# Patient Record
Sex: FEMALE | Race: WHITE | NOT HISPANIC OR LATINO | Employment: OTHER | URBAN - METROPOLITAN AREA
[De-identification: names, ages, dates, MRNs, and addresses within clinical notes are randomized per-mention and may not be internally consistent; named-entity substitution may affect disease eponyms.]

---

## 2021-01-01 ENCOUNTER — HISTORICAL (OUTPATIENT)
Dept: ADMINISTRATIVE | Facility: HOSPITAL | Age: 76
End: 2021-01-01

## 2021-01-01 LAB
ABS NEUT (OLG): 5.91 X10(3)/MCL (ref 2.1–9.2)
BASOPHILS # BLD AUTO: 0.1 X10(3)/MCL (ref 0–0.2)
BASOPHILS NFR BLD AUTO: 1 %
EOSINOPHIL # BLD AUTO: 0.2 X10(3)/MCL (ref 0–0.9)
EOSINOPHIL NFR BLD AUTO: 2 %
ERYTHROCYTE [DISTWIDTH] IN BLOOD BY AUTOMATED COUNT: 16.1 % (ref 11.5–17)
HCT VFR BLD AUTO: 35.9 % (ref 37–47)
HGB BLD-MCNC: 11.6 GM/DL (ref 12–16)
LYMPHOCYTES # BLD AUTO: 1.7 X10(3)/MCL (ref 0.6–4.6)
LYMPHOCYTES NFR BLD AUTO: 20 %
MCH RBC QN AUTO: 28.5 PG (ref 27–31)
MCHC RBC AUTO-ENTMCNC: 32.3 GM/DL (ref 33–36)
MCV RBC AUTO: 88.2 FL (ref 80–94)
MONOCYTES # BLD AUTO: 0.6 X10(3)/MCL (ref 0.1–1.3)
MONOCYTES NFR BLD AUTO: 7 %
NEUTROPHILS # BLD AUTO: 5.91 X10(3)/MCL (ref 2.1–9.2)
NEUTROPHILS NFR BLD AUTO: 69 %
PLATELET # BLD AUTO: 304 X10(3)/MCL (ref 130–400)
PMV BLD AUTO: 11.1 FL (ref 9.4–12.4)
RBC # BLD AUTO: 4.07 X10(6)/MCL (ref 4.2–5.4)
WBC # SPEC AUTO: 8.5 X10(3)/MCL (ref 4.5–11.5)

## 2022-01-01 ENCOUNTER — HOSPITAL ENCOUNTER (INPATIENT)
Facility: HOSPITAL | Age: 77
LOS: 1 days | DRG: 270 | End: 2022-06-26
Attending: STUDENT IN AN ORGANIZED HEALTH CARE EDUCATION/TRAINING PROGRAM | Admitting: INTERNAL MEDICINE
Payer: MEDICARE

## 2022-01-01 ENCOUNTER — HISTORICAL (OUTPATIENT)
Dept: ADMINISTRATIVE | Facility: HOSPITAL | Age: 77
End: 2022-01-01

## 2022-01-01 VITALS — BODY MASS INDEX: 35.43 KG/M2 | WEIGHT: 220.44 LBS | TEMPERATURE: 93 F | HEART RATE: 83 BPM | HEIGHT: 66 IN

## 2022-01-01 VITALS
WEIGHT: 166 LBS | SYSTOLIC BLOOD PRESSURE: 118 MMHG | DIASTOLIC BLOOD PRESSURE: 70 MMHG | HEIGHT: 64 IN | BODY MASS INDEX: 28.34 KG/M2 | OXYGEN SATURATION: 97 %

## 2022-01-01 DIAGNOSIS — I21.3 STEMI (ST ELEVATION MYOCARDIAL INFARCTION): ICD-10-CM

## 2022-01-01 DIAGNOSIS — E87.5 HYPERKALEMIA: ICD-10-CM

## 2022-01-01 DIAGNOSIS — I21.3 ST ELEVATION MYOCARDIAL INFARCTION (STEMI), UNSPECIFIED ARTERY: Primary | ICD-10-CM

## 2022-01-01 DIAGNOSIS — I21.11 STEMI INVOLVING RIGHT CORONARY ARTERY: ICD-10-CM

## 2022-01-01 DIAGNOSIS — I25.10 CAD (CORONARY ARTERY DISEASE): ICD-10-CM

## 2022-01-01 DIAGNOSIS — I44.2 COMPLETE HEART BLOCK: ICD-10-CM

## 2022-01-01 DIAGNOSIS — N17.9 ACUTE RENAL FAILURE, UNSPECIFIED ACUTE RENAL FAILURE TYPE: ICD-10-CM

## 2022-01-01 LAB
ABORH RETYPE: NORMAL
ABS NEUT (OLG): 11.4 X10(3)/MCL (ref 2.1–9.2)
ALBUMIN SERPL-MCNC: 1.8 GM/DL (ref 3.4–4.8)
ALBUMIN/GLOB SERPL: 0.7 RATIO (ref 1.1–2)
ALP SERPL-CCNC: 215 UNIT/L (ref 40–150)
ALT SERPL-CCNC: 568 UNIT/L (ref 0–55)
ANION GAP SERPL CALC-SCNC: 18 MEQ/L
ANISOCYTOSIS BLD QL SMEAR: ABNORMAL
AST SERPL-CCNC: 863 UNIT/L (ref 5–34)
BASE EXCESS ARTERIAL: NORMAL
BASE EXCESS ARTERIAL: NORMAL
BILIRUBIN DIRECT+TOT PNL SERPL-MCNC: 0.8 MG/DL
BUN SERPL-MCNC: 39.2 MG/DL (ref 9.8–20.1)
BUN SERPL-MCNC: 39.4 MG/DL (ref 9.8–20.1)
BURR CELLS (OLG): ABNORMAL
CALCIUM SERPL-MCNC: 7.5 MG/DL (ref 8.4–10.2)
CALCIUM SERPL-MCNC: 7.9 MG/DL (ref 8.4–10.2)
CHLORIDE SERPL-SCNC: 108 MMOL/L (ref 98–107)
CHLORIDE SERPL-SCNC: 98 MMOL/L (ref 98–107)
CO2 SERPL-SCNC: 10 MMOL/L (ref 23–31)
CO2 SERPL-SCNC: 15 MMOL/L (ref 23–31)
CORRECTED TEMPERATURE (PCO2): 47 MMHG (ref 35–45)
CORRECTED TEMPERATURE (PCO2): 54 MMHG (ref 19–50)
CORRECTED TEMPERATURE (PH): 7.03 (ref 7.29–7.61)
CORRECTED TEMPERATURE (PH): 7.1 (ref 7.29–7.61)
CORRECTED TEMPERATURE (PO2): 76 MMHG (ref 80–100)
CORRECTED TEMPERATURE (PO2): 84 MMHG (ref 80–100)
CREAT SERPL-MCNC: 2.29 MG/DL (ref 0.55–1.02)
CREAT SERPL-MCNC: 2.37 MG/DL (ref 0.55–1.02)
CREAT/UREA NIT SERPL: 17
EJECTION FRACTION: 32 %
ERYTHROCYTE [DISTWIDTH] IN BLOOD BY AUTOMATED COUNT: 17.7 % (ref 11.5–17)
FIBRINOGEN PPP-MCNC: 203 MG/DL (ref 210–463)
GLOBULIN SER-MCNC: 2.6 GM/DL (ref 2.4–3.5)
GLUCOSE SERPL-MCNC: 223 MG/DL (ref 82–115)
GLUCOSE SERPL-MCNC: 514 MG/DL (ref 82–115)
GROUP & RH: NORMAL
HCO3 ARTERIAL: NORMAL
HCO3 ARTERIAL: NORMAL
HCO3 UR-SCNC: 14.3 MMOL/L
HCO3 UR-SCNC: 14.6 MMOL/L
HCT VFR BLD AUTO: 33.2 % (ref 37–47)
HGB BLD-MCNC: 10.4 G/DL (ref 12–16)
HGB BLD-MCNC: 7.8 G/DL (ref 12–16)
HGB BLD-MCNC: 9.3 GM/DL (ref 12–16)
HGB BLD-MCNC: NORMAL G/DL
HGB BLD-MCNC: NORMAL G/DL
IMM GRANULOCYTES # BLD AUTO: 1.15 X10(3)/MCL (ref 0–0.02)
IMM GRANULOCYTES NFR BLD AUTO: 7.5 % (ref 0–0.43)
INDIRECT COOMBS GEL: NORMAL
INR BLD: 2.2 (ref 0–1.3)
INSTRUMENT WBC (OLG): 15.4 X10(3)/MCL
LYMPHOCYTES NFR BLD MANUAL: 27 %
LYMPHOCYTES NFR BLD MANUAL: 4.16 X10(3)/MCL
MACROCYTES BLD QL SMEAR: ABNORMAL
MAGNESIUM SERPL-MCNC: 2.2 MG/DL (ref 1.6–2.6)
MCH RBC QN AUTO: 30.2 PG (ref 27–31)
MCHC RBC AUTO-ENTMCNC: 28 MG/DL (ref 33–36)
MCV RBC AUTO: 107.8 FL (ref 80–94)
METAMYELOCYTES NFR BLD MANUAL: 3 %
MYELOCYTES NFR BLD MANUAL: 4 %
NEUTROPHILS NFR BLD MANUAL: 67 %
NRBC BLD AUTO-RTO: 1.7 %
NRBC BLD MANUAL-RTO: 1 %
PCO2 BLDA: 44 MMHG
PCO2 BLDA: 47 MMHG (ref 35–45)
PCO2 BLDA: 54 MMHG (ref 19–50)
PCO2 BLDA: NORMAL MM[HG]
PH SMN: 7.03 [PH] (ref 7.29–7.61)
PH SMN: 7.03 [PH] (ref 7.29–7.61)
PH SMN: 7.1 [PH] (ref 7.29–7.61)
PH SMN: NORMAL [PH]
PH SMN: NORMAL [PH]
PLATELET # BLD AUTO: 165 X10(3)/MCL (ref 130–400)
PLATELET # BLD EST: ADEQUATE 10*3/UL
PMV BLD AUTO: 10.5 FL (ref 9.4–12.4)
PO2 BLDA: 62 MMHG
PO2 BLDA: 76 MMHG (ref 80–100)
PO2 BLDA: 84 MMHG (ref 80–100)
PO2 BLDA: NORMAL MM[HG]
PO2 BLDA: NORMAL MM[HG]
POC BASE DEFICIT: -14.5 MMOL/L (ref -2–2)
POC BASE DEFICIT: -15.6 MMOL/L (ref -2–2)
POC BASE DEFICIT: -18.7 MMOL/L
POC COHB: 1.6 %
POC COHB: 1.8 %
POC COHB: NORMAL
POC COHB: NORMAL
POC HCO3: 11.6 MMOL/L
POC IONIZED CALCIUM: 1.01 MMOL/L (ref 1.12–1.23)
POC IONIZED CALCIUM: 1.07 MMOL/L (ref 1.12–1.23)
POC IONIZED CALCIUM: 1.23 MMOL/L (ref 1.12–1.23)
POC IONIZED CALCIUM: NORMAL
POC IONIZED CALCIUM: NORMAL
POC METHB: 0.4 % (ref 0.4–2)
POC METHB: 0.7 % (ref 0.4–2)
POC METHB: NORMAL
POC METHB: NORMAL
POC O2HB: 90.2 % (ref 94–97)
POC O2HB: 92.9 % (ref 94–97)
POC O2HB: NORMAL
POC O2HB: NORMAL
POC SATURATED O2: 77 %
POC SATURATED O2: 88.6 %
POC SATURATED O2: 89.6 %
POC TEMPERATURE: 37 C
POC TEMPERATURE: 37 °C
POC TEMPERATURE: 37 °C
POIKILOCYTOSIS BLD QL SMEAR: ABNORMAL
POTASSIUM BLD-SCNC: 5.7 MMOL/L (ref 3.5–5)
POTASSIUM BLD-SCNC: 6.7 MMOL/L
POTASSIUM BLD-SCNC: 7.2 MMOL/L (ref 2.4–6.5)
POTASSIUM BLD-SCNC: NORMAL MMOL/L
POTASSIUM BLD-SCNC: NORMAL MMOL/L
POTASSIUM SERPL-SCNC: 5.8 MMOL/L (ref 3.5–5.1)
POTASSIUM SERPL-SCNC: 7 MMOL/L (ref 3.5–5.1)
PROT SERPL-MCNC: 4.4 GM/DL (ref 5.8–7.6)
PROTHROMBIN TIME: 24 SECONDS (ref 12.5–14.5)
RBC # BLD AUTO: 3.08 X10(6)/MCL (ref 4.2–5.4)
SATURATED O2 ARTERIAL, I-STAT: NORMAL
SATURATED O2 ARTERIAL, I-STAT: NORMAL
SODIUM BLD-SCNC: 125 MMOL/L (ref 137–145)
SODIUM BLD-SCNC: 126 MMOL/L (ref 137–145)
SODIUM BLD-SCNC: 131 MMOL/L (ref 137–145)
SODIUM BLD-SCNC: NORMAL MMOL/L
SODIUM BLD-SCNC: NORMAL MMOL/L
SODIUM SERPL-SCNC: 131 MMOL/L (ref 136–145)
SODIUM SERPL-SCNC: 137 MMOL/L (ref 136–145)
SPECIMEN SOURCE: ABNORMAL
WBC # SPEC AUTO: 15.4 X10(3)/MCL (ref 4.5–11.5)

## 2022-01-01 PROCEDURE — 27201423 OPTIME MED/SURG SUP & DEVICES STERILE SUPPLY: Performed by: INTERNAL MEDICINE

## 2022-01-01 PROCEDURE — C1769 GUIDE WIRE: HCPCS | Performed by: INTERNAL MEDICINE

## 2022-01-01 PROCEDURE — 63600175 PHARM REV CODE 636 W HCPCS

## 2022-01-01 PROCEDURE — 86920 COMPATIBILITY TEST SPIN: CPT | Performed by: INTERNAL MEDICINE

## 2022-01-01 PROCEDURE — 36415 COLL VENOUS BLD VENIPUNCTURE: CPT | Performed by: INTERNAL MEDICINE

## 2022-01-01 PROCEDURE — 25000003 PHARM REV CODE 250

## 2022-01-01 PROCEDURE — 86850 RBC ANTIBODY SCREEN: CPT | Performed by: INTERNAL MEDICINE

## 2022-01-01 PROCEDURE — 85384 FIBRINOGEN ACTIVITY: CPT | Performed by: INTERNAL MEDICINE

## 2022-01-01 PROCEDURE — 27000221 HC OXYGEN, UP TO 24 HOURS

## 2022-01-01 PROCEDURE — 25000003 PHARM REV CODE 250: Performed by: INTERNAL MEDICINE

## 2022-01-01 PROCEDURE — 94002 VENT MGMT INPAT INIT DAY: CPT

## 2022-01-01 PROCEDURE — C1887 CATHETER, GUIDING: HCPCS | Performed by: INTERNAL MEDICINE

## 2022-01-01 PROCEDURE — 99900035 HC TECH TIME PER 15 MIN (STAT)

## 2022-01-01 PROCEDURE — 20000000 HC ICU ROOM

## 2022-01-01 PROCEDURE — 27200966 HC CLOSED SUCTION SYSTEM

## 2022-01-01 PROCEDURE — 82803 BLOOD GASES ANY COMBINATION: CPT

## 2022-01-01 PROCEDURE — 33967 INSERT I-AORT PERCUT DEVICE: CPT | Performed by: INTERNAL MEDICINE

## 2022-01-01 PROCEDURE — 93458 L HRT ARTERY/VENTRICLE ANGIO: CPT | Mod: 59 | Performed by: INTERNAL MEDICINE

## 2022-01-01 PROCEDURE — 25500020 PHARM REV CODE 255: Performed by: INTERNAL MEDICINE

## 2022-01-01 PROCEDURE — 83735 ASSAY OF MAGNESIUM: CPT | Performed by: NURSE PRACTITIONER

## 2022-01-01 PROCEDURE — 92941 PRQ TRLML REVSC TOT OCCL AMI: CPT | Mod: RC | Performed by: INTERNAL MEDICINE

## 2022-01-01 PROCEDURE — 99285 EMERGENCY DEPT VISIT HI MDM: CPT | Mod: 25

## 2022-01-01 PROCEDURE — 96365 THER/PROPH/DIAG IV INF INIT: CPT

## 2022-01-01 PROCEDURE — C1757 CATH, THROMBECTOMY/EMBOLECT: HCPCS | Performed by: INTERNAL MEDICINE

## 2022-01-01 PROCEDURE — 36415 COLL VENOUS BLD VENIPUNCTURE: CPT | Performed by: NURSE PRACTITIONER

## 2022-01-01 PROCEDURE — C1725 CATH, TRANSLUMIN NON-LASER: HCPCS | Performed by: INTERNAL MEDICINE

## 2022-01-01 PROCEDURE — 80053 COMPREHEN METABOLIC PANEL: CPT | Performed by: NURSE PRACTITIONER

## 2022-01-01 PROCEDURE — 92921 HC PTCA , ADD'L BRANCH: CPT | Mod: RC | Performed by: INTERNAL MEDICINE

## 2022-01-01 PROCEDURE — 25000242 PHARM REV CODE 250 ALT 637 W/ HCPCS: Performed by: INTERNAL MEDICINE

## 2022-01-01 PROCEDURE — C1753 CATH, INTRAVAS ULTRASOUND: HCPCS | Performed by: INTERNAL MEDICINE

## 2022-01-01 PROCEDURE — 36600 WITHDRAWAL OF ARTERIAL BLOOD: CPT

## 2022-01-01 PROCEDURE — 85025 COMPLETE CBC W/AUTO DIFF WBC: CPT | Performed by: NURSE PRACTITIONER

## 2022-01-01 PROCEDURE — 37799 UNLISTED PX VASCULAR SURGERY: CPT

## 2022-01-01 PROCEDURE — 92978 ENDOLUMINL IVUS OCT C 1ST: CPT | Mod: RC | Performed by: INTERNAL MEDICINE

## 2022-01-01 PROCEDURE — 25000003 PHARM REV CODE 250: Performed by: STUDENT IN AN ORGANIZED HEALTH CARE EDUCATION/TRAINING PROGRAM

## 2022-01-01 PROCEDURE — 96366 THER/PROPH/DIAG IV INF ADDON: CPT

## 2022-01-01 PROCEDURE — 85610 PROTHROMBIN TIME: CPT | Performed by: INTERNAL MEDICINE

## 2022-01-01 PROCEDURE — 96375 TX/PRO/DX INJ NEW DRUG ADDON: CPT

## 2022-01-01 PROCEDURE — C1894 INTRO/SHEATH, NON-LASER: HCPCS | Performed by: INTERNAL MEDICINE

## 2022-01-01 PROCEDURE — 63600175 PHARM REV CODE 636 W HCPCS: Performed by: INTERNAL MEDICINE

## 2022-01-01 PROCEDURE — C1883 ADAPT/EXT, PACING/NEURO LEAD: HCPCS | Performed by: INTERNAL MEDICINE

## 2022-01-01 RX ORDER — FAMOTIDINE 10 MG/ML
20 INJECTION INTRAVENOUS EVERY 12 HOURS
Status: DISCONTINUED | OUTPATIENT
Start: 2022-01-01 | End: 2022-01-01 | Stop reason: HOSPADM

## 2022-01-01 RX ORDER — NITROGLYCERIN 5 MG/ML
INJECTION, SOLUTION INTRAVENOUS
Status: DISCONTINUED | OUTPATIENT
Start: 2022-01-01 | End: 2022-01-01 | Stop reason: HOSPADM

## 2022-01-01 RX ORDER — HYDROCODONE BITARTRATE AND ACETAMINOPHEN 500; 5 MG/1; MG/1
TABLET ORAL
Status: DISCONTINUED | OUTPATIENT
Start: 2022-01-01 | End: 2022-01-01 | Stop reason: HOSPADM

## 2022-01-01 RX ORDER — HYDROMORPHONE HYDROCHLORIDE 2 MG/ML
0.2 INJECTION, SOLUTION INTRAMUSCULAR; INTRAVENOUS; SUBCUTANEOUS EVERY 10 MIN PRN
Status: DISCONTINUED | OUTPATIENT
Start: 2022-01-01 | End: 2022-01-01 | Stop reason: HOSPADM

## 2022-01-01 RX ORDER — FAMOTIDINE 10 MG/ML
20 INJECTION INTRAVENOUS
Status: DISCONTINUED | OUTPATIENT
Start: 2022-01-01 | End: 2022-01-01 | Stop reason: HOSPADM

## 2022-01-01 RX ORDER — SODIUM BICARBONATE 1 MEQ/ML
SYRINGE (ML) INTRAVENOUS
Status: DISCONTINUED | OUTPATIENT
Start: 2022-01-01 | End: 2022-01-01 | Stop reason: HOSPADM

## 2022-01-01 RX ORDER — SODIUM BICARBONATE 1 MEQ/ML
100 SYRINGE (ML) INTRAVENOUS ONCE
Status: COMPLETED | OUTPATIENT
Start: 2022-01-01 | End: 2022-01-01

## 2022-01-01 RX ORDER — MORPHINE SULFATE 4 MG/ML
INJECTION, SOLUTION INTRAMUSCULAR; INTRAVENOUS
Status: COMPLETED
Start: 2022-01-01 | End: 2022-01-01

## 2022-01-01 RX ORDER — OXYMETAZOLINE HCL 0.05 %
2 SPRAY, NON-AEROSOL (ML) NASAL 2 TIMES DAILY
Status: DISCONTINUED | OUTPATIENT
Start: 2022-01-01 | End: 2022-01-01 | Stop reason: HOSPADM

## 2022-01-01 RX ORDER — NOREPINEPHRINE BITARTRATE/D5W 8 MG/250ML
PLASTIC BAG, INJECTION (ML) INTRAVENOUS
Status: COMPLETED
Start: 2022-01-01 | End: 2022-01-01

## 2022-01-01 RX ORDER — CALCIUM CHLORIDE INJECTION 100 MG/ML
1 INJECTION, SOLUTION INTRAVENOUS
Status: COMPLETED | OUTPATIENT
Start: 2022-01-01 | End: 2022-01-01

## 2022-01-01 RX ORDER — CLOPIDOGREL BISULFATE 75 MG/1
75 TABLET ORAL DAILY
Status: DISCONTINUED | OUTPATIENT
Start: 2022-06-27 | End: 2022-01-01 | Stop reason: HOSPADM

## 2022-01-01 RX ORDER — NAPROXEN SODIUM 220 MG/1
324 TABLET, FILM COATED ORAL ONCE
Status: DISCONTINUED | OUTPATIENT
Start: 2022-01-01 | End: 2022-01-01 | Stop reason: HOSPADM

## 2022-01-01 RX ORDER — ASPIRIN 325 MG
TABLET ORAL
Status: DISCONTINUED | OUTPATIENT
Start: 2022-01-01 | End: 2022-01-01 | Stop reason: HOSPADM

## 2022-01-01 RX ORDER — SODIUM CHLORIDE 9 MG/ML
INJECTION, SOLUTION INTRAVENOUS ONCE
Status: CANCELLED | OUTPATIENT
Start: 2022-01-01 | End: 2022-01-01

## 2022-01-01 RX ORDER — CLOPIDOGREL 300 MG/1
600 TABLET, FILM COATED ORAL ONCE
Status: DISCONTINUED | OUTPATIENT
Start: 2022-01-01 | End: 2022-01-01 | Stop reason: HOSPADM

## 2022-01-01 RX ORDER — NAPROXEN SODIUM 220 MG/1
81 TABLET, FILM COATED ORAL DAILY
Status: DISCONTINUED | OUTPATIENT
Start: 2022-06-27 | End: 2022-01-01 | Stop reason: HOSPADM

## 2022-01-01 RX ORDER — DIPHENHYDRAMINE HYDROCHLORIDE 50 MG/ML
25 INJECTION INTRAMUSCULAR; INTRAVENOUS
Status: DISCONTINUED | OUTPATIENT
Start: 2022-01-01 | End: 2022-01-01

## 2022-01-01 RX ORDER — ONDANSETRON 4 MG/1
8 TABLET, ORALLY DISINTEGRATING ORAL EVERY 8 HOURS PRN
Status: DISCONTINUED | OUTPATIENT
Start: 2022-01-01 | End: 2022-01-01 | Stop reason: HOSPADM

## 2022-01-01 RX ORDER — CLOPIDOGREL 300 MG/1
600 TABLET, FILM COATED ORAL DAILY
Status: DISCONTINUED | OUTPATIENT
Start: 2022-01-01 | End: 2022-01-01

## 2022-01-01 RX ORDER — HEPARIN SODIUM 1000 [USP'U]/ML
INJECTION, SOLUTION INTRAVENOUS; SUBCUTANEOUS
Status: DISCONTINUED | OUTPATIENT
Start: 2022-01-01 | End: 2022-01-01 | Stop reason: HOSPADM

## 2022-01-01 RX ORDER — LORAZEPAM 2 MG/ML
INJECTION INTRAMUSCULAR
Status: COMPLETED
Start: 2022-01-01 | End: 2022-01-01

## 2022-01-01 RX ORDER — DIPHENHYDRAMINE HYDROCHLORIDE 50 MG/ML
INJECTION INTRAMUSCULAR; INTRAVENOUS
Status: DISCONTINUED | OUTPATIENT
Start: 2022-01-01 | End: 2022-01-01 | Stop reason: HOSPADM

## 2022-01-01 RX ORDER — LIDOCAINE HYDROCHLORIDE 10 MG/ML
INJECTION INFILTRATION; PERINEURAL
Status: DISCONTINUED | OUTPATIENT
Start: 2022-01-01 | End: 2022-01-01 | Stop reason: HOSPADM

## 2022-01-01 RX ORDER — NOREPINEPHRINE BITARTRATE/D5W 8 MG/250ML
0-3 PLASTIC BAG, INJECTION (ML) INTRAVENOUS CONTINUOUS
Status: DISCONTINUED | OUTPATIENT
Start: 2022-01-01 | End: 2022-01-01 | Stop reason: HOSPADM

## 2022-01-01 RX ORDER — SODIUM CHLORIDE 9 MG/ML
INJECTION, SOLUTION INTRAVENOUS CONTINUOUS
Status: DISCONTINUED | OUTPATIENT
Start: 2022-01-01 | End: 2022-01-01 | Stop reason: HOSPADM

## 2022-01-01 RX ORDER — SODIUM BICARBONATE 1 MEQ/ML
SYRINGE (ML) INTRAVENOUS
Status: DISCONTINUED
Start: 2022-01-01 | End: 2022-01-01 | Stop reason: HOSPADM

## 2022-01-01 RX ORDER — DIPHENHYDRAMINE HYDROCHLORIDE 50 MG/ML
50 INJECTION INTRAMUSCULAR; INTRAVENOUS
Status: DISCONTINUED | OUTPATIENT
Start: 2022-01-01 | End: 2022-01-01 | Stop reason: HOSPADM

## 2022-01-01 RX ORDER — SODIUM BICARBONATE 1 MEQ/ML
SYRINGE (ML) INTRAVENOUS
Status: COMPLETED
Start: 2022-01-01 | End: 2022-01-01

## 2022-01-01 RX ORDER — NOREPINEPHRINE BITARTRATE/D5W 8 MG/250ML
PLASTIC BAG, INJECTION (ML) INTRAVENOUS
Status: DISCONTINUED
Start: 2022-01-01 | End: 2022-01-01 | Stop reason: HOSPADM

## 2022-01-01 RX ORDER — ALBUTEROL SULFATE 0.83 MG/ML
15 SOLUTION RESPIRATORY (INHALATION) ONCE
Status: COMPLETED | OUTPATIENT
Start: 2022-01-01 | End: 2022-01-01

## 2022-01-01 RX ORDER — PHENYLEPHRINE HCL IN 0.9% NACL 1 MG/10 ML
SYRINGE (ML) INTRAVENOUS
Status: DISCONTINUED | OUTPATIENT
Start: 2022-01-01 | End: 2022-01-01 | Stop reason: HOSPADM

## 2022-01-01 RX ORDER — MORPHINE SULFATE 4 MG/ML
1 INJECTION, SOLUTION INTRAMUSCULAR; INTRAVENOUS EVERY 10 MIN PRN
Status: DISCONTINUED | OUTPATIENT
Start: 2022-01-01 | End: 2022-01-01

## 2022-01-01 RX ORDER — GLYCOPYRROLATE 1 MG/5ML
1 SOLUTION ORAL 3 TIMES DAILY PRN
Status: DISCONTINUED | OUTPATIENT
Start: 2022-01-01 | End: 2022-01-01 | Stop reason: HOSPADM

## 2022-01-01 RX ORDER — CLOPIDOGREL 300 MG/1
TABLET, FILM COATED ORAL
Status: DISCONTINUED | OUTPATIENT
Start: 2022-01-01 | End: 2022-01-01 | Stop reason: HOSPADM

## 2022-01-01 RX ORDER — ALBUTEROL SULFATE 2.5 MG/.5ML
SOLUTION RESPIRATORY (INHALATION)
Status: DISCONTINUED
Start: 2022-01-01 | End: 2022-01-01 | Stop reason: HOSPADM

## 2022-01-01 RX ORDER — SODIUM CHLORIDE 0.9 % (FLUSH) 0.9 %
10 SYRINGE (ML) INJECTION
Status: DISCONTINUED | OUTPATIENT
Start: 2022-01-01 | End: 2022-01-01 | Stop reason: HOSPADM

## 2022-01-01 RX ORDER — METHYLPREDNISOLONE SOD SUCC 125 MG
125 VIAL (EA) INJECTION
Status: DISCONTINUED | OUTPATIENT
Start: 2022-01-01 | End: 2022-01-01 | Stop reason: HOSPADM

## 2022-01-01 RX ORDER — METHYLPREDNISOLONE SOD SUCC 125 MG
VIAL (EA) INJECTION
Status: DISCONTINUED | OUTPATIENT
Start: 2022-01-01 | End: 2022-01-01 | Stop reason: HOSPADM

## 2022-01-01 RX ORDER — FAMOTIDINE 20 MG/1
TABLET, FILM COATED ORAL
Status: DISCONTINUED | OUTPATIENT
Start: 2022-01-01 | End: 2022-01-01 | Stop reason: HOSPADM

## 2022-01-01 RX ORDER — MORPHINE SULFATE ORAL SOLUTION 10 MG/5ML
10 SOLUTION ORAL EVERY 10 MIN PRN
Status: DISCONTINUED | OUTPATIENT
Start: 2022-01-01 | End: 2022-01-01 | Stop reason: HOSPADM

## 2022-01-01 RX ORDER — ADENOSINE 3 MG/ML
INJECTION, SOLUTION INTRAVENOUS
Status: DISCONTINUED | OUTPATIENT
Start: 2022-01-01 | End: 2022-01-01 | Stop reason: HOSPADM

## 2022-01-01 RX ORDER — SODIUM BICARBONATE 1 MEQ/ML
50 SYRINGE (ML) INTRAVENOUS
Status: COMPLETED | OUTPATIENT
Start: 2022-01-01 | End: 2022-01-01

## 2022-01-01 RX ADMIN — SODIUM BICARBONATE 50 MEQ: 84 INJECTION INTRAVENOUS at 10:06

## 2022-01-01 RX ADMIN — Medication 100 MEQ: at 02:06

## 2022-01-01 RX ADMIN — CALCIUM CHLORIDE 1 G: 100 INJECTION INTRAVENOUS; INTRAVENTRICULAR at 10:06

## 2022-01-01 RX ADMIN — SODIUM CHLORIDE: 9 INJECTION, SOLUTION INTRAVENOUS at 10:06

## 2022-01-01 RX ADMIN — NOREPINEPHRINE BITARTRATE 0.3 MCG/KG/MIN: 8 INJECTION, SOLUTION INTRAVENOUS at 09:06

## 2022-01-01 RX ADMIN — MORPHINE SULFATE 10 MG: 4 INJECTION, SOLUTION INTRAMUSCULAR; INTRAVENOUS at 03:06

## 2022-01-01 RX ADMIN — MORPHINE SULFATE 10 MG: 4 INJECTION INTRAVENOUS at 03:06

## 2022-01-01 RX ADMIN — LORAZEPAM: 2 INJECTION INTRAMUSCULAR; INTRAVENOUS at 03:06

## 2022-01-01 RX ADMIN — Medication 3 MCG/KG/MIN: at 01:06

## 2022-01-01 RX ADMIN — SODIUM BICARBONATE 100 MEQ: 84 INJECTION, SOLUTION INTRAVENOUS at 02:06

## 2022-01-01 RX ADMIN — NOREPINEPHRINE BITARTRATE 3 MCG/KG/MIN: 8 INJECTION, SOLUTION INTRAVENOUS at 01:06

## 2022-01-01 RX ADMIN — ALBUTEROL SULFATE 15 MG: 2.5 SOLUTION RESPIRATORY (INHALATION) at 11:06

## 2022-06-26 NOTE — Clinical Note
An angiography was performed of the right coronary arteries. The angiography was performed via power injection.

## 2022-06-26 NOTE — DISCHARGE SUMMARY
[facility] - [facility] ICU  Pulmonary Critical Care Note    Patient Name: Darlene Bernard  MRN: [id number]  Admission Date: 6/26/2022  Hospital Length of Stay: 0 days  Code Status: DNR  Attending Provider: FABIO Garcia MD  Primary Care Provider: Primary Doctor Julia       I was called to the patient's bedside and notified that the patient's rhythm was asystole. Upon entering the room, I was able to confirm this rhythm. My examination at this time is as follows:    -Absent heart sounds  -Absent breath sounds with no evidence of spontaneous breathing  -No evidence of radial, femoral and carotid pulses  -No evidence of oculocephalic, corneal and pupillary reflexes  -No withdrawal to stimuli of all four extremities    Time of Death: 16:04   Cause of Death: Cardiac arrest 2/2 to Hemorrhagic and Cardiogenic Shock    Walter Richardson MD  Pulmonary Critical Care Medicine  [facility] - [facility] ICU

## 2022-06-26 NOTE — BRIEF OP NOTE
Brief Operative Note:    : Frank Lopez MD     Referring Physician: ,[facility] - Telemedicine     All Operators: Surgeon(s):  Frank Lopez MD     Preoperative Diagnosis: STEMI     Postop Diagnosis: STEMI    Treatments/Procedures: Procedure(s) (LRB):  ANGIOGRAM, CORONARY ARTERY (N/A)  PTCA, Single Vessel  INSERTION, INTRA-AORTIC BALLOON PUMP    Findings: Instent thrombosis of the mid RCA stent. The PDA is also occluded due to thrombotic embolization. S/p mechanical thrombectomy of the RCA into PDA. PCTA with 3 mm NC balloon to high pressure of the mid RCA stent. Residual 20-30% disease in the mid RCA stent. Moderate ISR of the proximal stent. Distal RCA stent patent and patent proximal PDA stent. Microcatheter used for PDA to give multiple rounds of IC Nitro, Nipride, Adenosine, tpA.  PTCA of the distal PDA was also performed with a 2 mm compliant balloon. Still without flow into the very distal PDA. Left coronary system: patent undersized left circumflex stent with mild ISR. 90% ostial OM2 lesion. Patent mid OM2 stent with mild ISR. Patent proximal LAD stent. 40-50% mid LAD stenosis. Left to right collaterals noted. PTT out of range high probably due to coagulopathy. Therefore, Aggrastat infusion stopped. IABP placed.  See catheterization report for full details.    Estimated Blood loss: 25 cc (from the procedure). She has a coagulopathy - hx of clotting disorder? Shock liver and is bleeding from the oropharynx (200 cc in the canister).     Specimens removed: No    Frank Lopez

## 2022-06-26 NOTE — Clinical Note
Unable to chart aggrastat dose correctly due to charting. Aggrastat bolus volume of 10 ml intracoronary was given by Dr. Lopez.

## 2022-06-26 NOTE — ED PROVIDER NOTES
Encounter Date: 6/26/2022       History     Chief Complaint   Patient presents with    transfer     Post cardiac arrest tx from [facility] for cardiology / ICU     This is a 76-year-old female medical history significant for CAD, multiple stents who presents emergency department via transfer from outside hospital via air.  Evidently patient woke up this morning complained of some shortness of breath and possible chest pain and collapsed.  This information was acquired from  who was not witnessed initial event and son who arrived at scene.  He reports that his mother was speaking to him briefly before she became unresponsive CPR was started.  Patient was taken outside hospital where she reportedly was found to be in asystole, she had 3-5 bouts of cardiopulmonary arrest.  Was revived with epinephrine, bicarb, placed on Levophed and flown here with concern for an inferior STEMI.  Here in the emergency department patient remains unresponsive, intubated, not any sedating medications.  She is currently being paced at a rate of 70. Blood pressure in the 90s systolic.  Critically ill.    The history is provided by the spouse, the EMS personnel and medical records.     Review of patient's allergies indicates:   Allergen Reactions    Adhesive      Other reaction(s): whelps    Anti-inhibitor coagulant cmplx      Other reaction(s): Other (See Comments)  ACQUIRED HEMOPHILIA A ON HEMLIBRA    Aspirin      Other reaction(s): Other (See Comments)    Codeine      Other reaction(s): itching, out of body experience, Other (See Comments)    Ivp dye [iodinated contrast media]      Other reaction(s): itching, rash, respiratory distress    Latex, natural rubber      Other reaction(s): rash, causes cuts in skin with bleeding, itching, swelling  no respiratory distress    Morphine Other (See Comments)     Other reaction(s): itching, out of body experience    Oxycodone-acetaminophen Itching     Other reaction(s): Other  (See Comments)    Shellfish containing products Other (See Comments)     Other reaction(s): respiratory distress    Sulfa (sulfonamide antibiotics)      Other reaction(s): itching, headache, vomiting, Other (See Comments)    Ticagrelor      Other reaction(s): Respiratory arrest    Hydromorphone Rash     Other reaction(s): Other (See Comments)    Iodine Rash and Other (See Comments)     No past medical history on file.  Past Surgical History:   Procedure Laterality Date    CORONARY ANGIOGRAPHY N/A 6/26/2022    Procedure: ANGIOGRAM, CORONARY ARTERY;  Surgeon: Frank Lopez MD;  Location: [facility] CATH LAB;  Service: Cardiology;  Laterality: N/A;    INSERTION OF TEMPORARY PACEMAKER N/A 6/26/2022    Procedure: INSERTION, PACEMAKER, TEMPORARY;  Surgeon: Frank Lopez MD;  Location: [facility] CATH LAB;  Service: Cardiology;  Laterality: N/A;    LEFT HEART CATHETERIZATION Left 6/26/2022    Procedure: Left heart cath;  Surgeon: Frank Lopez MD;  Location: [facility] CATH LAB;  Service: Cardiology;  Laterality: Left;     No family history on file.     Review of Systems   Unable to perform ROS: Intubated       Physical Exam     Initial Vitals   BP Pulse Resp Temp SpO2   06/26/22 0930 06/26/22 0930 06/26/22 0930 06/26/22 1353 06/26/22 1058   91/63 70 18 (!) 92.6 °F (33.7 °C) 97 %      MAP       --                Physical Exam    Constitutional: She appears well-developed.   Ill appearing   HENT:   Head: Normocephalic.   Right Ear: External ear normal.   Left Ear: External ear normal.   Mouth/Throat: Oropharynx is clear and moist.   ETT present   Eyes: Right eye exhibits no discharge. Left eye exhibits no discharge.   Pupils 4-5 bilaterally, fixed   Neck: Neck supple. No tracheal deviation present.   Cardiovascular:   Pads to chest, currently being paced  Radial pulse 2 +bilaterally, consistent with pacing   Pulmonary/Chest: Breath sounds normal.   Artificial respirations, bilateral breath sounds, on ventilator   Abdominal:  Abdomen is soft.   Musculoskeletal:         General: Edema (Edema to bilateral lower extremities) present.      Cervical back: Neck supple.     Neurological:   Patient intubated, unresponsive, no sedation   Skin: Skin is warm and dry.         ED Course   Critical Care    Date/Time: 6/26/2022 9:58 AM  Performed by: Rick Peralta MD  Authorized by: Rick Peralta MD   Total critical care time (exclusive of procedural time) : 75 minutes  Critical care time was exclusive of separately billable procedures and treating other patients.  Critical care was necessary to treat or prevent imminent or life-threatening deterioration of the following conditions: cardiac failure, circulatory failure, CNS failure or compromise, metabolic crisis, renal failure and shock.  Critical care was time spent personally by me on the following activities: discussions with consultants, evaluation of patient's response to treatment, obtaining history from patient or surrogate, ordering and review of laboratory studies, pulse oximetry, review of old charts, transcutaneous pacing, re-evaluation of patient's condition, examination of patient, interpretation of cardiac output measurements and development of treatment plan with patient or surrogate.  Comments: Patient arrives transfer from outside hospital, critically ill, STEMI, intubated, hypotensive, on pressors, to cath lab.  Critical care time spent talking to outside hospital, consult, Cardiology, family, evaluation examination of patient, response to treatment.        Labs Reviewed - No data to display       Imaging Results    None          Medications   NORepinephrine bitartrate 8 mg (LEVOPHED) 8 mg/250 mL (32 mcg/mL) in dextrose 5% 250 mL infusion (3 mcg/kg/min  Rate/Dose Change 6/26/22 0955)   sodium bicarbonate 8.4 % (1 mEq/mL) injection 50 mEq (50 mEq Intravenous Given 6/26/22 1000)   calcium chloride 100 mg/mL (10 %) injection 1 g (1 g Intravenous Given 6/26/22 1000)   albuterol  nebulizer solution 15 mg (15 mg Nebulization Given 6/26/22 1150)   sodium bicarbonate 8.4 % (1 mEq/mL) injection 100 mEq (100 mEq Intravenous Given 6/26/22 1445)   lorazepam (ATIVAN) 2 mg/mL injection (  Given 6/26/22 1530)     Medical Decision Making:   Initial Assessment:   Patient with multiple cardiac arrest at outside hospital transferred here for cath lab.  Cardiac history.  Critically ill.  Intubated not sedated.  Differential Diagnosis:   STEMI, PE, acute renal failure, electrolyte derangement, tension pneumothorax  Clinical Tests:   Lab Tests: Reviewed and Ordered  Radiological Study: Reviewed  Medical Tests: Reviewed  ED Management:  Patient remains critically ill.  Will be transferred to cath lab for inferior STEMI.  She has been seen evaluated by cardiology, Dr Lopez, [facility], here in the emergency department.  Hypotensive, restarting Levophed here in the ED.  Currently being paced at rate of 70, there is both electrical capture and a dopplerable signal on my exam.  Pupils fixed and dilated.  She had multiple doses of epi at outside hospital.  Unresponsive despite being on no sedation.  Abdomen soft.  Discussed with  who defers decision making to patient's children.  Discussed with both daughter and son patient's critical condition, they both like patient to be DNR should she code again they are okay with proceeding to cath lab.  Cardiology aware of plan.  Informed ICU patient.  Patient remains critical condition to cath lab.                      Clinical Impression:   Final diagnoses:  [I21.3] ST elevation myocardial infarction (STEMI), unspecified artery (Primary)  [I44.2] Complete heart block  [N17.9] Acute renal failure, unspecified acute renal failure type  [E87.5] Hyperkalemia          ED Disposition Condition    Admit               Rick Peralta MD  06/26/22 1007       Rick Peralta MD  07/11/22 1036

## 2022-06-26 NOTE — Clinical Note
The catheter was inserted into the left ventricle. Hemodynamics were performed.  and Pullback was recorded.  Pigtail

## 2022-06-26 NOTE — Clinical Note
Unable to chart dose of adenosine correctly due to charting.total amount of adenosine given is 720 mcg

## 2022-06-26 NOTE — CONSULTS
Inpatient consult to Cardiology  Consult performed by: ROSITA Buckley  Consult ordered by: Matt J Landry, ANP [facility] - Emergency Dept  Cardiology  Consult Note    Patient Name: Darlene Bernard  MRN: [id number]  Admission Date: 6/26/2022  Hospital Length of Stay: 0 days  Code Status: No Order   Attending Provider: Rick Peralta MD   Consulting Provider: ROSITA Buckley  Primary Care Physician: No primary care provider on file.  Principal Problem:<principal problem not specified>    Patient information was obtained from past medical records and ER records.     Subjective:     Chief Complaint: Reason for Consult: STEMI     HPI: Ms. Bernard is a 77 y/o female who is known to [facility], Dr. Rodrigues. The patient was at home on her date of arrival and when she woke up she c/o SOB/CP and collapsed. Bystander CPR was immediately started. She was emergently taken to an [facility] where she reportedly was found to be in asystole and had 5 separate episodes of Cardiac Arrest in which she reportedly received Epi, Bicarb and CPR and was placed on IV Levophed for Severe Hypotension. She was ultimately Intubated and Ventilated and Emergently Transferred to [facility]. It important to note that she had an STEFAN and an Elevated Potassium. Her EKG upon arrival and prior exhibited High Grade AVB and Inferior/Lateral ST Elevations. [facility] has been consulted for STEMI.     PMH: Anemia, Bilateral RICKY, Adenoma of L Adrenal Gland, Dilated CMO, CKD, COPD, Bilateral DIANA, Hx of MI, Tobacco Abuse, HLD, GERD, AAA, PAD/Iliac Stents, CAD/Stents  PSH: ELIAZAR, Appendectomy, Angiogram, , Thoracotomy, Hernia Repair  Family History: Father, D, 52, HTN, MI; Mother, D, 75, HTN, MI; Sister, L, DM II  Social History: Denies Illicit Drug and ETOH Use; + 1 ppd Tobacco Use for 50+ Years    Previous Cardiac Diagnostics:   BLE Arterial US 4.12.22:  The study quality is average.   50 - 75% stenosis detected in  the left distal superficial femoral artery.   30 - 49% stenosis detected in the right deep femoral artery.     ECHO 6.7.21:  The study quality is average.   The left ventricle is normal in size with moderate left ventricular hypertrophy and normal global left ventricular systolic function. The left ventricular ejection fraction is 45%. Regional wall motion analysis shows hypokinesis of basal inferoseptal segment, basal inferior segment, mid inferior segment and basal inferolateral segment. Wall motion score index is 2. The left ventricle diastolic function is impaired (Grade I) with normal left atrial pressure. AVG GLS= -14.2%  Left atrium is mildly enlarged.   Mild mitral annular calcification is noted.  Mild calcification of the aortic valve is noted with adequate cuspal excursion.   Mitral mitral valve prolapse is noted. Both the anterior and posterior mitral leaflets are prolapsed.   Mild (1+) mitral regurgitation.   Trace aortic regurgitation. Trace tricuspid regurgitation.   The pulmonary artery appears normal.     MPI 6.4.21:  This is an abnormal perfusion study. Study is consistent with prior infarction.   This scan is suggestive of high risk for future cardiovascular events.   Small fixed perfusion abnormality of severe intensity in the inferior lateral region.   The left ventricular cavity is noted to be normal on the stress study. The left ventricular ejection fraction was calculated to be 41% and left ventricular global function is mildly reduced.   The study quality is good.     Carotid US 7.21.20:  The study quality is average.   40-59% stenosis in the proximal right internal carotid artery based on Bluth Criteria.  1-39% stenosis in the proximal left internal carotid artery based on Bluth Criteria.   Greater than 50% stenosis in the bilateral external carotid arteries.   The bilateral vertebral arteries demonstrate antegrade vertebral artery flow.     [facility]/Peripheral Angiogram 8.18.17:  L Main: Normal  and Lis  LAD: Normal, LIS and Non-Obstructive CAD  LCx: Mid LCx Patent Stent; Distal LCx 60%  RCA: Distal RCA PTCA/LUCIEN Placed; 3 R PLB: Proximal Subsection 85% Reduced to 0%; R PAV; Proximal 80% Reduced to 0%  L Renal 90% Stenosis s/p PTA/Stent Placement  R Renal Normal with LIs    Review of patient's allergies indicates:   Allergen Reactions    Adhesive      Other reaction(s): whelps    Anti-inhibitor coagulant cmplx      Other reaction(s): Other (See Comments)  ACQUIRED HEMOPHILIA A ON HEMLIBRA    Aspirin      Other reaction(s): Other (See Comments)    Codeine      Other reaction(s): itching, out of body experience, Other (See Comments)    Ivp dye [iodinated contrast media]      Other reaction(s): itching, rash, respiratory distress    Latex, natural rubber      Other reaction(s): rash, causes cuts in skin with bleeding, itching, swelling  no respiratory distress    Morphine Other (See Comments)     Other reaction(s): itching, out of body experience    Oxycodone-acetaminophen Itching     Other reaction(s): Other (See Comments)    Shellfish containing products Other (See Comments)     Other reaction(s): respiratory distress    Sulfa (sulfonamide antibiotics)      Other reaction(s): itching, headache, vomiting, Other (See Comments)    Ticagrelor      Other reaction(s): Respiratory arrest    Hydromorphone Rash     Other reaction(s): Other (See Comments)    Iodine Rash and Other (See Comments)       No current facility-administered medications on file prior to encounter.     No current outpatient medications on file prior to encounter.     Review of Systems:  Review of Systems   Unable to perform ROS: Intubated (Vented)       Objective:     Vital Signs (Most Recent):  Pulse: 70 (06/26/22 0930)  Resp: 18 (06/26/22 0930)  BP: 91/63 (06/26/22 0930) Vital Signs (24h Range):  Pulse:  [70] 70  Resp:  [18] 18  BP: (91)/(63) 91/63     Weight: 100 kg (220 lb 7.4 oz)  Body mass index is 37.84 kg/m².       O2 Device  (Oxygen Therapy): ventilator    No intake or output data in the 24 hours ending 06/26/22 0942    Significant Labs:  No results found for this or any previous visit (from the past 72 hour(s)).    Significant Imaging: None    Last Lipids:  No results found for: CHOL  No results found for: HDL  No results found for: LDLCALC  No results found for: TRIG  No results found for: CHOLHDL    EKG:  No results found for this visit on 06/26/22.    Telemetry: SR 70s    Physical Exam  Constitutional:       Appearance: Normal appearance. She is obese.      Interventions: She is intubated.      Comments: Vented/Sedated   HENT:      Head: Normocephalic.      Mouth/Throat:      Mouth: Mucous membranes are moist.      Comments: ETT  Cardiovascular:      Rate and Rhythm: Normal rate. Rhythm irregular.      Pulses: Normal pulses.      Heart sounds: Normal heart sounds.      Comments: Heart Block (High Grade)  Pulmonary:      Effort: She is intubated.      Comments: Ventilator Associated Breath Sounds  Oxygen Concentration (%):  (98) 98 (Ventilator)  Abdominal:      Palpations: Abdomen is soft.   Skin:     General: Skin is warm.   Neurological:      Comments: Vented/Sedated       Home Medications:   No current facility-administered medications on file prior to encounter.     No current outpatient medications on file prior to encounter.       Current Inpatient Medications:    Current Facility-Administered Medications:     aspirin chewable tablet 324 mg, 324 mg, Oral, Once, ROSITA Buckley    [START ON 6/27/2022] aspirin chewable tablet 81 mg, 81 mg, Oral, Daily, ROSITA Buckley    clopidogreL tablet 600 mg, 600 mg, Oral, Once, ROSITA Buckley    [START ON 6/27/2022] clopidogreL tablet 75 mg, 75 mg, Oral, Daily, ROSITA Buckley    diphenhydrAMINE injection 50 mg, 50 mg, Intravenous, On Call Procedure, ROSITA Buckley    famotidine (PF) injection 20 mg, 20 mg, Intravenous, On Call Procedure, ROSITA Buckley     methylPREDNISolone sodium succinate injection 125 mg, 125 mg, Intravenous, On Call Procedure, ROSITA Buckley  No current outpatient medications on file.    VTE Risk Mitigation (From admission, onward)    None        Assessment:   Out of Hospital/Unwitnessed Cardiac Arrest s/p Bystander CPR    - s/p ROSC after multiple Events of Cardiac Arrest (x5)  STEMI - Inferolateral Lead  High Grade AVB requiring Transcutaneous Pacing  STEFAN vs CKD  Electrolyte Derangements - Hyperkalemia   Hypotension requiring Pressors - Hx of HTN  Electrolyte Derangement - Hyperkalemia  CAD/Stents  ICMO/EF 45% (ECHO 6.7.21)  RICKY/Bilaterally Moderate  Tobacco Abuse  GERD  HTN  HLD  AAA without Rupture  Renal Artery Stenosis s/p L Renal Artery Stenting (2017)  Hx of MI  Anemia  PAD/Stents  No Hx of GIB    Plan:   ASA 324mg OG x 1 Now  Plavix 600mg PO x 1 Now  Wean Pressors for MAP > 65mmHg  Vent Management per Primary Team  IVP Dye Prep:    - Benadryl 50mg IVP x 1 Now    - Pepcid 20mg IVP x 1 Now    - Solumedrol 125mg IV x1 Now  Keep NPO  Schedule/Consent for Emergent LHC with Possible PTCA +/- Stenting with Anesthesia  Risk, Benefits and Alternatives Reviewed and Discussed with the PTs Family and they wish to proceed with above Procedure.  Heparin Bolus and Drip per Protocol  ECHO Today  Labs and EKG in AM: CBC, CMP and Mg    Thank you for your consult.     ROSITA Buckley  Cardiology  [facility] - Emergency Dept  06/26/2022 9:42 AM

## 2022-06-26 NOTE — H&P
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 Waldron) , CAD s/p stent to left circumflex and RCA, CKD, COPD, PAD s/p iliac stents, dilated cardiomyopathy (EF 45% on 6/7/21) who presented to [facility] as a transfer from [facility] following a out-of-hospital cardiac arrest. Patient reportedly chest pain and shortness of breath that began this AM and EMS was called. Patient lost a pulse upon EMS arrival and patient was coded on the way to the hospital. At [facility], patient was noted reportedly code 5-6 more times and EKG showed STEMI. She was transferred to [facility] and brought to the cath lab and was noted to have re-stenosis of her RCA and left circumflex stents. She is s/p mechanical thrombectomy of the mid RCA stent. Distal RCA stenet also noted to be stenosed but no improvement with multiple rounds of nitro, adenosine and tpa. Left circumflex also noted to be 90% stenoses but further intervention was stopped as PTT was noted to be high. Patient was started on levophed and an intraaortic ballon pump was placed. Patient was then transferred to the ICU.    Hospital Course/Significant events:      24 Hour Interval History:      No past medical history on file.    Social History     Socioeconomic History    Marital status:            Objective:   No current outpatient medications    Current Inpatient Medications   sodium bicarbonate        albuterol sulfate        aspirin  324 mg Oral Once    [START ON 6/27/2022] aspirin  81 mg Oral Daily    calcium gluconate IVPB  1 g  Intravenous Once    clopidogreL  600 mg Oral Once    [START ON 6/27/2022] clopidogreL  75 mg Oral Daily    famotidine (PF)  20 mg Intravenous Q12H    hydrocortisone sod succ (PF)  250 mg Intravenous Q8H    insulin regular  10 Units Intravenous Once    NORepinephrine bitartrate 8 mg        NORepinephrine bitartrate 8 mg        NORepinephrine bitartrate 8 mg        NORepinephrine bitartrate 8 mg        oxymetazoline  2 spray Each Nostril BID    sodium bicarbonate  100 mEq Intravenous Once    sodium bicarbonate        sodium zirconium cyclosilicate  10 g Oral Once         No intake or output data in the 24 hours ending 06/26/22 1447    Review of Systems   Unable to perform ROS: Intubated        Vital Signs (Most Recent):  Temp: (!) 92.6 °F (33.7 °C) (06/26/22 1355)  Pulse: 83 (06/26/22 1355)  Resp: (!) 25 (06/26/22 1355)  BP: 101/60 (06/26/22 1355)  SpO2: (!) 89 % (06/26/22 1355)  Body mass index is 35.6 kg/m².  Weight: 100 kg (220 lb 7.4 oz) Vital Signs (24h Range):  Temp:  [92.6 °F (33.7 °C)] 92.6 °F (33.7 °C)  Pulse:  [70-86] 83  Resp:  [18-26] 25  SpO2:  [89 %-97 %] 89 %  BP: ()/(60-63) 101/60     Physical Exam  Vitals and nursing note reviewed.   Constitutional:       Comments: Patient is intubated but not sedated   HENT:      Head: Normocephalic and atraumatic.      Nose:      Comments: NG tube noted with active bleeding noted  Eyes:      Comments: Pupils are 5+ bilaterally   Cardiovascular:      Rate and Rhythm: Regular rhythm. Tachycardia present.      Pulses: Normal pulses.      Heart sounds: No murmur heard.  Pulmonary:      Effort: Pulmonary effort is normal. No respiratory distress.      Breath sounds: Normal breath sounds. No wheezing.   Abdominal:      General: Abdomen is flat. There is distension.      Palpations: Abdomen is soft.      Tenderness: There is no abdominal tenderness.   Musculoskeletal:         General: No swelling.      Cervical back: Normal range of motion and neck  supple.   Skin:     Capillary Refill: Capillary refill takes 2 to 3 seconds.      Coloration: Skin is pale.      Findings: Bruising present.   Neurological:      Comments: No evidence of corneal, pupillary, cough or gag reflex. Does not withdrawal to painful stimuli to all four extremities           Mechanical ventilation support:  Vent Mode: A/C PRVC (06/26/22 1150)  Ventilator Initiated: Yes (06/26/22 0930)  Set Rate: 26 BPM (06/26/22 1150)  Vt Set: 460 mL (06/26/22 1150)  PEEP/CPAP: 5 cmH20 (06/26/22 1150)  Oxygen Concentration (%): 100 (06/26/22 1150)  Peak Airway Pressure: 23 cmH2O (06/26/22 0920)    Lines/Drains/Airways     Drain  Duration                Sheath 06/26/22 1017 Left lower <1 day         Sheath 06/26/22 1017 Right lower <1 day          Airway  Duration                Airway - Non-Surgical Endotracheal Tube -- days          Peripheral Intravenous Line  Duration                Peripheral IV - Single Lumen 06/26/22 0946 20 G Distal;Left;Posterior Forearm <1 day                Significant Labs:    Lab Results   Component Value Date    WBC 15.4 (H) 06/26/2022    HGB 9.3 (L) 06/26/2022    HCT 33.2 (L) 06/26/2022    .8 (H) 06/26/2022     06/26/2022         BMP  Lab Results   Component Value Date     (L) 06/26/2022    K 5.8 (H) 06/26/2022    CO2 15 (L) 06/26/2022    BUN 39.4 (H) 06/26/2022    CREATININE 2.37 (H) 06/26/2022    CALCIUM 7.5 (L) 06/26/2022    EGFRNONAA 21 06/26/2022       ABG  Recent Labs   Lab 06/26/22  1350   PH 7.03*   PO2 62*   PCO2 44   HCO3 11.6           Significant Imaging:  CXR (6/26/22): Per my interpretation, trachea is midline with ET tube noted 2 cm above the filipe, no bony abnormalities, cardiac silhouette is within normal limits, there are no diaphragmatic abnormalities and there is no effusions or consolidations noted.    Assessment/Plan:     Assessment  1. Out-of-hospital cardiac arrest 2/2 to STEMI. S/p mechanical thrombectomy of RCA and placement of  intra-aortic balloon pump  2. Acute Hypoxic Respiratory Failure 2/2 to mechanical ventilation  3. Mixed shock, hemorrhagic and cardiogenic  4. Acute Renal Failure  5. Acute Liver Failure  6. Hyperglycemia  7. Hx of Hemophilia A, currently in remission  8. Hx of CAD s/p stent placement      Plan  1. An unfortunate case with poor prognosis. Patient's family has made the patient DNR and will withdrawal care. Will transition to comfort care and palliatively extubate the patient.    DVT Prophylaxis: none  GI Prophylaxis: famotidine     Greater than 30 minutes of critical care was time spent personally by me on the following activities: development of treatment plan with patient or surrogate and bedside caregivers, discussions with consultants, evaluation of patient's response to treatment, examination of patient, ordering and performing treatments and interventions, ordering and review of laboratory studies, ordering and review of radiographic studies, pulse oximetry, re-evaluation of patient's condition.  This critical care time did not overlap with that of any other provider or involve time for any procedures.     Walter Richardson MD  Pulmonary Critical Care Medicine  [facility] - [facility] ICU

## 2022-06-26 NOTE — NURSING
"Pt transported from cath lab to ICU maxed on vasopressors bleeding from every orifice. Family stated that the pt had an "aquired hemophilia". Pt's hematologist from [place] was called. Pt continued to decompensate despite bicarb pushes and drip as well as vasopressors and IABP. Family decided to withdraw care. Pt was given Morphine and Ativan. IABP and temporary pacer were turned off. Family and  in attendance. TOD 1603.  "

## 2022-06-27 LAB
ABO + RH BLD: NORMAL
ANION GAP SERPL CALC-SCNC: 21 MMOL/L (ref 8–16)
BLD PROD TYP BPU: NORMAL
BLOOD UNIT EXPIRATION DATE: NORMAL
BLOOD UNIT TYPE CODE: 5100
BUN SERPL-MCNC: 43 MG/DL (ref 6–30)
CHLORIDE SERPL-SCNC: 104 MMOL/L (ref 95–110)
CREAT SERPL-MCNC: 2.4 MG/DL (ref 0.5–1.4)
CROSSMATCH INTERPRETATION: NORMAL
DISPENSE STATUS: NORMAL
GLUCOSE SERPL-MCNC: 267 MG/DL (ref 70–110)
HCT VFR BLD CALC: 30 %PCV (ref 36–54)
HGB BLD-MCNC: 10 G/DL
POC IONIZED CALCIUM: 1.07 MMOL/L (ref 1.06–1.42)
POC TCO2 (MEASURED): 17 MMOL/L (ref 23–29)
POTASSIUM BLD-SCNC: 6.4 MMOL/L (ref 3.5–5.1)
SAMPLE: ABNORMAL
SODIUM BLD-SCNC: 134 MMOL/L (ref 136–145)
UNIT NUMBER: NORMAL

## 2022-06-30 LAB
ABO + RH BLD: NORMAL
ABO + RH BLD: NORMAL
BLD PROD TYP BPU: NORMAL
BLD PROD TYP BPU: NORMAL
BLOOD UNIT EXPIRATION DATE: NORMAL
BLOOD UNIT EXPIRATION DATE: NORMAL
BLOOD UNIT TYPE CODE: 5100
BLOOD UNIT TYPE CODE: 5100
CROSSMATCH INTERPRETATION: NORMAL
CROSSMATCH INTERPRETATION: NORMAL
DISPENSE STATUS: NORMAL
DISPENSE STATUS: NORMAL
UNIT NUMBER: NORMAL
UNIT NUMBER: NORMAL

## (undated) DEVICE — KIT INDIGO CATH RX ASP 140CM

## (undated) DEVICE — CATH .025X6IN 7.5FR 16MM40ML

## (undated) DEVICE — CATH IMPULSE MP 5FR 145CM

## (undated) DEVICE — GUIDE LAUNCHER 6FR AL .75 SH

## (undated) DEVICE — CATH EAGLE EYE PLATINUM

## (undated) DEVICE — BLLN SC EUPHORA RX 2.00MMX20MM

## (undated) DEVICE — VALVE CONTROL COPILOT

## (undated) DEVICE — CANNULA ADULT NASAL 7FT

## (undated) DEVICE — Device

## (undated) DEVICE — CATH MICRO CORSAIR PRO 135CM

## (undated) DEVICE — BLLN NC EUPHORA RX 30 X 20

## (undated) DEVICE — DEVICE INDEFLATOR BASIX

## (undated) DEVICE — SHEATH INTRO PINNACLE 6F 10CM

## (undated) DEVICE — BLLN SC EUPHORA RX 2.00MMX15MM

## (undated) DEVICE — BLLN SC EUPHORA RX 3.00MMX20MM

## (undated) DEVICE — INTRODUCER ANGIO PINNACLE 5X10

## (undated) DEVICE — CANISTER INDIGO ENGINE

## (undated) DEVICE — DEVICE TRAPPER EXCHANGE

## (undated) DEVICE — CATH ELECTRODE BLLN 5FR 110CM

## (undated) DEVICE — ELECTRODE DEFIB NON-RADIOPAQUE

## (undated) DEVICE — GUIDE LAUNCH 6FR AL 7.5

## (undated) DEVICE — SET ANGIO ACIST CVI ANGIOTOUCH

## (undated) DEVICE — GUIDEWIRE RUNTHROUGH EF 180CM